# Patient Record
Sex: FEMALE | Race: BLACK OR AFRICAN AMERICAN | NOT HISPANIC OR LATINO | URBAN - METROPOLITAN AREA
[De-identification: names, ages, dates, MRNs, and addresses within clinical notes are randomized per-mention and may not be internally consistent; named-entity substitution may affect disease eponyms.]

---

## 2023-08-20 ENCOUNTER — EMERGENCY (EMERGENCY)
Facility: HOSPITAL | Age: 38
LOS: 1 days | Discharge: HOME | End: 2023-08-20
Attending: STUDENT IN AN ORGANIZED HEALTH CARE EDUCATION/TRAINING PROGRAM
Payer: MEDICAID

## 2023-08-20 VITALS
HEIGHT: 63 IN | WEIGHT: 199.96 LBS | OXYGEN SATURATION: 100 % | SYSTOLIC BLOOD PRESSURE: 140 MMHG | RESPIRATION RATE: 18 BRPM | DIASTOLIC BLOOD PRESSURE: 88 MMHG | HEART RATE: 73 BPM | TEMPERATURE: 98 F

## 2023-08-20 PROCEDURE — 99243 OFF/OP CNSLTJ NEW/EST LOW 30: CPT

## 2023-08-20 PROCEDURE — 99285 EMERGENCY DEPT VISIT HI MDM: CPT

## 2023-08-21 VITALS
TEMPERATURE: 98 F | HEART RATE: 55 BPM | OXYGEN SATURATION: 100 % | DIASTOLIC BLOOD PRESSURE: 77 MMHG | SYSTOLIC BLOOD PRESSURE: 119 MMHG | RESPIRATION RATE: 18 BRPM

## 2023-08-21 LAB
ALBUMIN SERPL ELPH-MCNC: 4 G/DL — SIGNIFICANT CHANGE UP (ref 3.3–5)
ALP SERPL-CCNC: 69 U/L — SIGNIFICANT CHANGE UP (ref 40–120)
ALT FLD-CCNC: 16 U/L — SIGNIFICANT CHANGE UP (ref 10–45)
ANION GAP SERPL CALC-SCNC: 12 MMOL/L — SIGNIFICANT CHANGE UP (ref 5–17)
ANISOCYTOSIS BLD QL: SLIGHT — SIGNIFICANT CHANGE UP
APPEARANCE UR: CLEAR — SIGNIFICANT CHANGE UP
AST SERPL-CCNC: 33 U/L — SIGNIFICANT CHANGE UP (ref 10–40)
BASOPHILS # BLD AUTO: 0 K/UL — SIGNIFICANT CHANGE UP (ref 0–0.2)
BASOPHILS NFR BLD AUTO: 0 % — SIGNIFICANT CHANGE UP (ref 0–2)
BILIRUB SERPL-MCNC: 0.4 MG/DL — SIGNIFICANT CHANGE UP (ref 0.2–1.2)
BILIRUB UR-MCNC: NEGATIVE — SIGNIFICANT CHANGE UP
BUN SERPL-MCNC: 8 MG/DL — SIGNIFICANT CHANGE UP (ref 7–23)
C TRACH RRNA SPEC QL NAA+PROBE: SIGNIFICANT CHANGE UP
CALCIUM SERPL-MCNC: 9.1 MG/DL — SIGNIFICANT CHANGE UP (ref 8.4–10.5)
CHLORIDE SERPL-SCNC: 105 MMOL/L — SIGNIFICANT CHANGE UP (ref 96–108)
CO2 SERPL-SCNC: 21 MMOL/L — LOW (ref 22–31)
COLOR SPEC: YELLOW — SIGNIFICANT CHANGE UP
CREAT SERPL-MCNC: 0.6 MG/DL — SIGNIFICANT CHANGE UP (ref 0.5–1.3)
DACRYOCYTES BLD QL SMEAR: SLIGHT — SIGNIFICANT CHANGE UP
DIFF PNL FLD: NEGATIVE — SIGNIFICANT CHANGE UP
EGFR: 116 ML/MIN/1.73M2 — SIGNIFICANT CHANGE UP
ELLIPTOCYTES BLD QL SMEAR: SIGNIFICANT CHANGE UP
EOSINOPHIL # BLD AUTO: 0.08 K/UL — SIGNIFICANT CHANGE UP (ref 0–0.5)
EOSINOPHIL NFR BLD AUTO: 0.9 % — SIGNIFICANT CHANGE UP (ref 0–6)
GIANT PLATELETS BLD QL SMEAR: PRESENT — SIGNIFICANT CHANGE UP
GLUCOSE SERPL-MCNC: 81 MG/DL — SIGNIFICANT CHANGE UP (ref 70–99)
GLUCOSE UR QL: NEGATIVE — SIGNIFICANT CHANGE UP
HCG UR QL: NEGATIVE — SIGNIFICANT CHANGE UP
HCT VFR BLD CALC: 31.2 % — LOW (ref 34.5–45)
HGB BLD-MCNC: 9.4 G/DL — LOW (ref 11.5–15.5)
HIV 1+2 AB+HIV1 P24 AG SERPL QL IA: SIGNIFICANT CHANGE UP
KETONES UR-MCNC: NEGATIVE — SIGNIFICANT CHANGE UP
LEUKOCYTE ESTERASE UR-ACNC: NEGATIVE — SIGNIFICANT CHANGE UP
LIDOCAIN IGE QN: 32 U/L — SIGNIFICANT CHANGE UP (ref 7–60)
LYMPHOCYTES # BLD AUTO: 3.4 K/UL — HIGH (ref 1–3.3)
LYMPHOCYTES # BLD AUTO: 36.6 % — SIGNIFICANT CHANGE UP (ref 13–44)
MACROCYTES BLD QL: SLIGHT — SIGNIFICANT CHANGE UP
MANUAL SMEAR VERIFICATION: SIGNIFICANT CHANGE UP
MCHC RBC-ENTMCNC: 18.1 PG — LOW (ref 27–34)
MCHC RBC-ENTMCNC: 30.1 GM/DL — LOW (ref 32–36)
MCV RBC AUTO: 60 FL — LOW (ref 80–100)
MICROCYTES BLD QL: SIGNIFICANT CHANGE UP
MONOCYTES # BLD AUTO: 0.42 K/UL — SIGNIFICANT CHANGE UP (ref 0–0.9)
MONOCYTES NFR BLD AUTO: 4.5 % — SIGNIFICANT CHANGE UP (ref 2–14)
N GONORRHOEA RRNA SPEC QL NAA+PROBE: SIGNIFICANT CHANGE UP
NEUTROPHILS # BLD AUTO: 5.39 K/UL — SIGNIFICANT CHANGE UP (ref 1.8–7.4)
NEUTROPHILS NFR BLD AUTO: 58 % — SIGNIFICANT CHANGE UP (ref 43–77)
NITRITE UR-MCNC: NEGATIVE — SIGNIFICANT CHANGE UP
OVALOCYTES BLD QL SMEAR: SLIGHT — SIGNIFICANT CHANGE UP
PH UR: 5.5 — SIGNIFICANT CHANGE UP (ref 5–8)
PLAT MORPH BLD: NORMAL — SIGNIFICANT CHANGE UP
PLATELET # BLD AUTO: 267 K/UL — SIGNIFICANT CHANGE UP (ref 150–400)
POIKILOCYTOSIS BLD QL AUTO: SIGNIFICANT CHANGE UP
POTASSIUM SERPL-MCNC: 4.7 MMOL/L — SIGNIFICANT CHANGE UP (ref 3.5–5.3)
POTASSIUM SERPL-SCNC: 4.7 MMOL/L — SIGNIFICANT CHANGE UP (ref 3.5–5.3)
PROT SERPL-MCNC: 7.3 G/DL — SIGNIFICANT CHANGE UP (ref 6–8.3)
PROT UR-MCNC: SIGNIFICANT CHANGE UP
RBC # BLD: 5.2 M/UL — SIGNIFICANT CHANGE UP (ref 3.8–5.2)
RBC # FLD: 23.5 % — HIGH (ref 10.3–14.5)
RBC BLD AUTO: ABNORMAL
SCHISTOCYTES BLD QL AUTO: SLIGHT — SIGNIFICANT CHANGE UP
SODIUM SERPL-SCNC: 138 MMOL/L — SIGNIFICANT CHANGE UP (ref 135–145)
SP GR SPEC: 1.03 — HIGH (ref 1.01–1.02)
SPECIMEN SOURCE: SIGNIFICANT CHANGE UP
T PALLIDUM AB TITR SER: NEGATIVE — SIGNIFICANT CHANGE UP
TARGETS BLD QL SMEAR: SLIGHT — SIGNIFICANT CHANGE UP
UROBILINOGEN FLD QL: NEGATIVE — SIGNIFICANT CHANGE UP
WBC # BLD: 9.3 K/UL — SIGNIFICANT CHANGE UP (ref 3.8–10.5)
WBC # FLD AUTO: 9.3 K/UL — SIGNIFICANT CHANGE UP (ref 3.8–10.5)

## 2023-08-21 PROCEDURE — 76830 TRANSVAGINAL US NON-OB: CPT

## 2023-08-21 PROCEDURE — 36415 COLL VENOUS BLD VENIPUNCTURE: CPT

## 2023-08-21 PROCEDURE — 81025 URINE PREGNANCY TEST: CPT

## 2023-08-21 PROCEDURE — 87491 CHLMYD TRACH DNA AMP PROBE: CPT

## 2023-08-21 PROCEDURE — 99285 EMERGENCY DEPT VISIT HI MDM: CPT | Mod: 25

## 2023-08-21 PROCEDURE — 87086 URINE CULTURE/COLONY COUNT: CPT

## 2023-08-21 PROCEDURE — 93975 VASCULAR STUDY: CPT | Mod: 26

## 2023-08-21 PROCEDURE — 96374 THER/PROPH/DIAG INJ IV PUSH: CPT | Mod: XU

## 2023-08-21 PROCEDURE — 80053 COMPREHEN METABOLIC PANEL: CPT

## 2023-08-21 PROCEDURE — 81001 URINALYSIS AUTO W/SCOPE: CPT

## 2023-08-21 PROCEDURE — 74177 CT ABD & PELVIS W/CONTRAST: CPT | Mod: MA

## 2023-08-21 PROCEDURE — 85025 COMPLETE CBC W/AUTO DIFF WBC: CPT

## 2023-08-21 PROCEDURE — 86703 HIV-1/HIV-2 1 RESULT ANTBDY: CPT

## 2023-08-21 PROCEDURE — 81003 URINALYSIS AUTO W/O SCOPE: CPT

## 2023-08-21 PROCEDURE — 96376 TX/PRO/DX INJ SAME DRUG ADON: CPT

## 2023-08-21 PROCEDURE — 83690 ASSAY OF LIPASE: CPT

## 2023-08-21 PROCEDURE — 84702 CHORIONIC GONADOTROPIN TEST: CPT

## 2023-08-21 PROCEDURE — 93975 VASCULAR STUDY: CPT

## 2023-08-21 PROCEDURE — 74177 CT ABD & PELVIS W/CONTRAST: CPT | Mod: 26,MA

## 2023-08-21 PROCEDURE — 87591 N.GONORRHOEAE DNA AMP PROB: CPT

## 2023-08-21 PROCEDURE — 76830 TRANSVAGINAL US NON-OB: CPT | Mod: 26

## 2023-08-21 PROCEDURE — 76856 US EXAM PELVIC COMPLETE: CPT

## 2023-08-21 PROCEDURE — 86780 TREPONEMA PALLIDUM: CPT

## 2023-08-21 PROCEDURE — 76856 US EXAM PELVIC COMPLETE: CPT | Mod: 26,59

## 2023-08-21 PROCEDURE — 96375 TX/PRO/DX INJ NEW DRUG ADDON: CPT

## 2023-08-21 PROCEDURE — 87389 HIV-1 AG W/HIV-1&-2 AB AG IA: CPT

## 2023-08-21 RX ORDER — MORPHINE SULFATE 50 MG/1
4 CAPSULE, EXTENDED RELEASE ORAL ONCE
Refills: 0 | Status: DISCONTINUED | OUTPATIENT
Start: 2023-08-21 | End: 2023-08-21

## 2023-08-21 RX ORDER — KETOROLAC TROMETHAMINE 30 MG/ML
15 SYRINGE (ML) INJECTION ONCE
Refills: 0 | Status: DISCONTINUED | OUTPATIENT
Start: 2023-08-21 | End: 2023-08-21

## 2023-08-21 RX ORDER — SODIUM CHLORIDE 9 MG/ML
1000 INJECTION INTRAMUSCULAR; INTRAVENOUS; SUBCUTANEOUS ONCE
Refills: 0 | Status: COMPLETED | OUTPATIENT
Start: 2023-08-21 | End: 2023-08-21

## 2023-08-21 RX ORDER — ONDANSETRON 8 MG/1
4 TABLET, FILM COATED ORAL ONCE
Refills: 0 | Status: COMPLETED | OUTPATIENT
Start: 2023-08-21 | End: 2023-08-21

## 2023-08-21 RX ORDER — MORPHINE SULFATE 50 MG/1
4 CAPSULE, EXTENDED RELEASE ORAL ONCE
Refills: 0 | Status: COMPLETED | OUTPATIENT
Start: 2023-08-21 | End: 2023-08-21

## 2023-08-21 RX ORDER — ACETAMINOPHEN 500 MG
975 TABLET ORAL ONCE
Refills: 0 | Status: COMPLETED | OUTPATIENT
Start: 2023-08-21 | End: 2023-08-21

## 2023-08-21 RX ORDER — ACETAMINOPHEN 500 MG
1000 TABLET ORAL ONCE
Refills: 0 | Status: COMPLETED | OUTPATIENT
Start: 2023-08-21 | End: 2023-08-21

## 2023-08-21 RX ADMIN — MORPHINE SULFATE 4 MILLIGRAM(S): 50 CAPSULE, EXTENDED RELEASE ORAL at 10:17

## 2023-08-21 RX ADMIN — Medication 975 MILLIGRAM(S): at 14:51

## 2023-08-21 RX ADMIN — ONDANSETRON 4 MILLIGRAM(S): 8 TABLET, FILM COATED ORAL at 02:41

## 2023-08-21 RX ADMIN — Medication 15 MILLIGRAM(S): at 08:54

## 2023-08-21 RX ADMIN — SODIUM CHLORIDE 1000 MILLILITER(S): 9 INJECTION INTRAMUSCULAR; INTRAVENOUS; SUBCUTANEOUS at 02:16

## 2023-08-21 RX ADMIN — MORPHINE SULFATE 4 MILLIGRAM(S): 50 CAPSULE, EXTENDED RELEASE ORAL at 02:16

## 2023-08-21 RX ADMIN — Medication 400 MILLIGRAM(S): at 02:41

## 2023-08-21 NOTE — CONSULT NOTE ADULT - ASSESSMENT
**INCOMPELTE***    37yo  LMP 7/ coming in with 1 week of stabbing LLQ pain improved with Tylenol, Toradol, Morphine here in the ED. Patient incidentally found to have right ovarian cysts largest 4.1cm, simple appearing. Patient does not have an acute abdomen and vital signs are normal. Low concern for acute GYN process such as torsion    #LLQ pain  - Possibly GI in nature given patient's self reported constipation and questionable passage of flatus. No peritoneal signs on exam.  - Left adnexa wnl on TVUS.  - c/w Tylenol, Toradol. Use opioids with caution given possible constipating side effect  - Recommend bowel regimen. On CTAP normal appendix and bowels    #Bilateral adnexal cysts  - 2 cysts 3 and 4cm, simple in appearance. Blood flow seen to both ovaries  - Recommend f/u outpatient. Patient may see NS clinic    [address] [place] [address] [address]  [place] [address] [phone]    SAMIR Linn, PGY3 39yo  LMP 7/ coming in with 1 week of stabbing LLQ pain improved with Tylenol, Toradol, Morphine here in the ED. Patient incidentally found to have right ovarian cysts largest 4.1cm, simple appearing. Patient does not have an acute abdomen and vital signs are normal. Low concern for acute GYN process such as torsion.    #LLQ pain  - Possibly GI in nature given patient's self reported constipation and questionable passage of flatus. No peritoneal signs on exam.  - Left adnexa wnl on TVUS. 3.2cm left adnexal cyst on CTAP  - c/w Tylenol, Toradol. Use opioids with caution given possible constipating side effect  - Recommend bowel regimen. On CTAP normal appendix and bowels    #Bilateral adnexal cysts  - 2 right adnexal cysts 3 and 4cm, simple in appearance. Blood flow seen to both ovaries  - Recommend f/u outpatient. Patient may see [address]  [place] [address]  [phone]    seen and d/w Dr. Ruslan Linn, PGY3

## 2023-08-21 NOTE — CONSULT NOTE ADULT - SUBJECTIVE AND OBJECTIVE BOX
KLAUS JOHNSON  38y  Female [id number]    HPI: 37yo  LMP 7/4 coming in with 1 week of stabbing LLQ pain that has been constant for the past week. Patient has not been taking pain medicine at home because of lack of access. She reports nausea and decreased appetite but denies vomiting. Denies fevers, chills, sick contacts, vaginal bleeding itching or burning. She has felt constipated the past week and last had a small bowel movement 2 days ago. Notably she passed small pebble like stool. She does not recall passing flatus since then. Patient denies alleviating or aggravating factors. Pain improved after recieving Tylenol, Toradol, and Morphine x2 in the ED. She arrived to the ED with 10/10 pain and last received morphine at 10am. Now 5 hrs later she reports return of pain to 8/10. Patient denied history of cysts prior to today's ED finding.    Name of GYN Physician: Last saw GYN in [place] 3 yrs ago    POB:  x1 @37wga, pC/S @27wga and rC/S @36wga c/b cervical insufficiency.   spontaneous  x1, D&Cx1  Pgyn: Denies fibroids, cysts, endometriosis, STI's, abnormal pap smears   PMH: Asthma- hospitalized 1 month ago on steroids  PSH: C/Sx2  Meds: Albuterol  All: NKDA  SH: Denies smoking use, drug use, alcohol use.       Hospital Meds:   MEDICATIONS  (STANDING):  acetaminophen     Tablet .. 975 milliGRAM(s) Oral once  morphine  - Injectable 4 milliGRAM(s) IV Push Once    MEDICATIONS  (PRN):      FAMILY HISTORY:      Vital Signs Last 24 Hrs  T(C): 36.6 (21 Aug 2023 12:00), Max: 36.9 (20 Aug 2023 23:31)  T(F): 97.9 (21 Aug 2023 12:00), Max: 98.4 (20 Aug 2023 23:31)  HR: 55 (21 Aug 2023 12:00) (50 - 73)  BP: 119/77 (21 Aug 2023 12:00) (119/77 - 144/80)  BP(mean): 91 (21 Aug 2023 12:00) (91 - 101)  RR: 18 (21 Aug 2023 12:00) (18 - 18)  SpO2: 100% (21 Aug 2023 12:00) (100% - 100%)    Parameters below as of 21 Aug 2023 12:00  Patient On (Oxygen Delivery Method): room air      Chaperone: Live Agosto MD  Physical Exam:   General: sitting comfortably in bed, NAD   CV: RR S1, S2 no m/r/g  Lungs: CTA b/l, good air flow b/l   Abd: Soft, moderately tender in LLQ, no guarding or rebound, non-distended.  Bowel sounds present.    :  No bleeding on pad. External labia wnl.  Bimanual exam with no cervical motion tenderness, moderate pain in left fornix. Uterus unable to palpate 2/2 body habitus, adnexa non palpable b/l.  Cervix closed  Ext: non-tender b/l, no edema     LABS:      Pregnancy Profile, Urine: Negative (23 @ 04:14)                          9.4    9.30  )-----------( 267      ( 21 Aug 2023 02:29 )             31.2         138  |  105  |  8   ----------------------------<  81  4.7   |  21<L>  |  0.60    Ca    9.1      21 Aug 2023 02:29    TPro  7.3  /  Alb  4.0  /  TBili  0.4  /  DBili  x   /  AST  33  /  ALT  16  /  AlkPhos  69      I&O's Detail      Urinalysis Basic - ( 21 Aug 2023 04:14 )    Color: Yellow / Appearance: Clear / S.028 / pH: x  Gluc: x / Ketone: Negative  / Bili: Negative / Urobili: Negative   Blood: x / Protein: Trace / Nitrite: Negative   Leuk Esterase: Negative / RBC: 2 /hpf / WBC 5 /HPF   Sq Epi: x / Non Sq Epi: x / Bacteria: Negative        RADIOLOGY & ADDITIONAL STUDIES:  < from: US Doppler Pelvis (23 @ 03:33) >    ACC: [id number] EXAM:  US TRANSVAGINAL   ORDERED BY:  JL JASMINE     ACC: [id number] EXAM:  US DPLX PELVIC   ORDERED BY:  JL JASMINE     ACC: [id number] EXAM:  US PELVIC COMPLETE   ORDERED BY:  JL JASMINE     PROCEDURE DATE:  2023          INTERPRETATION:  CLINICAL INFORMATION: Left pelvic/left lower quadrant   abdominal pain. Evaluate for torsion.    LMP: 2023    COMPARISON: None available.    TECHNIQUE: Endovaginal and transabdominal pelvic sonogram. Color and   Spectral Doppler was performed.    FINDINGS:  Uterus: 11.0 cm x 5.0 cm x 6.1 cm. Within normal limits.  Endometrium: 4 mm. Within normal limits.  Cervix: Multiple nabothian cysts some of which are complex.    Right ovary: 4.7 cm x 5.6 cm x 3.9 cm. 3.1 cm x 3.5 cmx 3.4 cm cyst. 2.7   cm x 3.1 cm x 4.1 cm cyst. Normal arterial and venous waveforms.  Left ovary: 3.3 cm x 1.7 cm x 2.4 cm. Within normal limits. Normal   arterial and venous waveforms.    Fluid: Trace free fluid in the cul-de-sac.    IMPRESSION:  No evidence of ovarian torsion. Multiple small right ovarian cysts.        --- End of Report ---            KOLBY HARRIS MD; Attending Radiologist  This document has been electronically signed. Aug 21 2023  3:56AM    < end of copied text >         KLAUS JOHNSON  38y  Female [id number]    HPI: 37yo  LMP 7/4 coming in with 1 week of stabbing LLQ pain that has been constant for the past week. Patient has not been taking pain medicine at home because of lack of access. She reports nausea and decreased appetite but denies vomiting. Denies fevers, chills, sick contacts, vaginal bleeding itching or burning. She has felt constipated the past week and last had a small bowel movement 2 days ago. Notably she passed small pebble like stool. She does not recall passing flatus since then. Patient denies alleviating or aggravating factors. Pain improved after recieving Tylenol, Toradol, and Morphine x2 in the ED. She arrived to the ED with 10/10 pain and last received morphine at 10am. Now 5 hrs later she reports return of pain to 8/10. Patient denied history of cysts prior to today's ED finding.    Name of GYN Physician: Last saw GYN in [place] 3 yrs ago    POB:  x1 @37wga, pC/S @27wga and rC/S @36wga c/b cervical insufficiency.   spontaneous  x1, D&Cx1  Pgyn: Denies fibroids, cysts, endometriosis, STI's, abnormal pap smears   PMH: Asthma- hospitalized 1 month ago on steroids  PSH: C/Sx2  Meds: Albuterol  All: NKDA  SH: Denies smoking use, drug use, alcohol use.       Hospital Meds:   MEDICATIONS  (STANDING):  acetaminophen     Tablet .. 975 milliGRAM(s) Oral once  morphine  - Injectable 4 milliGRAM(s) IV Push Once    MEDICATIONS  (PRN):      FAMILY HISTORY:      Vital Signs Last 24 Hrs  T(C): 36.6 (21 Aug 2023 12:00), Max: 36.9 (20 Aug 2023 23:31)  T(F): 97.9 (21 Aug 2023 12:00), Max: 98.4 (20 Aug 2023 23:31)  HR: 55 (21 Aug 2023 12:00) (50 - 73)  BP: 119/77 (21 Aug 2023 12:00) (119/77 - 144/80)  BP(mean): 91 (21 Aug 2023 12:00) (91 - 101)  RR: 18 (21 Aug 2023 12:00) (18 - 18)  SpO2: 100% (21 Aug 2023 12:00) (100% - 100%)    Parameters below as of 21 Aug 2023 12:00  Patient On (Oxygen Delivery Method): room air      Chaperone: Live Agosto MD  Physical Exam:   General: sitting comfortably in bed, NAD   CV: RR S1, S2 no m/r/g  Lungs: CTA b/l, good air flow b/l   Abd: Soft, moderately tender in LLQ, no guarding or rebound, non-distended.  Bowel sounds present.    :  No bleeding on pad. External labia wnl.  Bimanual exam with no cervical motion tenderness, moderate pain in left fornix. Uterus unable to palpate 2/2 body habitus, adnexa non palpable b/l.  Cervix closed  Ext: non-tender b/l, no edema     LABS:      Pregnancy Profile, Urine: Negative (23 @ 04:14)                          9.4    9.30  )-----------( 267      ( 21 Aug 2023 02:29 )             31.2         138  |  105  |  8   ----------------------------<  81  4.7   |  21<L>  |  0.60    Ca    9.1      21 Aug 2023 02:29    TPro  7.3  /  Alb  4.0  /  TBili  0.4  /  DBili  x   /  AST  33  /  ALT  16  /  AlkPhos  69      I&O's Detail      Urinalysis Basic - ( 21 Aug 2023 04:14 )    Color: Yellow / Appearance: Clear / S.028 / pH: x  Gluc: x / Ketone: Negative  / Bili: Negative / Urobili: Negative   Blood: x / Protein: Trace / Nitrite: Negative   Leuk Esterase: Negative / RBC: 2 /hpf / WBC 5 /HPF   Sq Epi: x / Non Sq Epi: x / Bacteria: Negative        RADIOLOGY & ADDITIONAL STUDIES:  < from: US Doppler Pelvis (23 @ 03:33) >    ACC: [id number] EXAM:  US TRANSVAGINAL   ORDERED BY:  JL JASMINE     ACC: [id number] EXAM:  US DPLX PELVIC   ORDERED BY:  JL JASMINE     ACC: [id number] EXAM:  US PELVIC COMPLETE   ORDERED BY:  JL JASMINE     PROCEDURE DATE:  2023          INTERPRETATION:  CLINICAL INFORMATION: Left pelvic/left lower quadrant   abdominal pain. Evaluate for torsion.    LMP: 2023    COMPARISON: None available.    TECHNIQUE: Endovaginal and transabdominal pelvic sonogram. Color and   Spectral Doppler was performed.    FINDINGS:  Uterus: 11.0 cm x 5.0 cm x 6.1 cm. Within normal limits.  Endometrium: 4 mm. Within normal limits.  Cervix: Multiple nabothian cysts some of which are complex.    Right ovary: 4.7 cm x 5.6 cm x 3.9 cm. 3.1 cm x 3.5 cmx 3.4 cm cyst. 2.7   cm x 3.1 cm x 4.1 cm cyst. Normal arterial and venous waveforms.  Left ovary: 3.3 cm x 1.7 cm x 2.4 cm. Within normal limits. Normal   arterial and venous waveforms.    Fluid: Trace free fluid in the cul-de-sac.    IMPRESSION:  No evidence of ovarian torsion. Multiple small right ovarian cysts.        --- End of Report ---            KOLBY HARRIS MD; Attending Radiologist  This document has been electronically signed. Aug 21 2023  3:56AM    < end of copied text >    < from: CT Abdomen and Pelvis w/ IV Cont (23 @ 07:35) >  ACC: [id number] EXAM:  CT ABDOMEN AND PELVIS IC   ORDERED BY:  JL JASMINE     PROCEDURE DATE:  2023          INTERPRETATION:  CLINICAL INFORMATION: Left lower quadrant tenderness.    COMPARISON: None.    CONTRAST/COMPLICATIONS:  IV Contrast: Omnipaque 300  90 cc administered   10 cc discarded  Oral Contrast: NONE  Complications: None reported at time of study completion    PROCEDURE:  CT of the Abdomen and Pelvis was performed.  Sagittal and coronal reformats were performed.    FINDINGS:  LOWER CHEST: Two 0.3 cm right middle lobe solid nodules.    LIVER: Within normal limits.  BILE DUCTS: Normal caliber.  GALLBLADDER: Within normal limits.  SPLEEN: Within normal limits.  PANCREAS: Within normal limits.  ADRENALS: Within normal limits.  KIDNEYS/URETERS: Within normal limits.    BLADDER: Mild perivesical fat stranding.  REPRODUCTIVE ORGANS: Normal uterus. Bilateral adnexal/ovarian cysts. The   right adnexal cyst measures 6.0 x 3.4 cm. The left adnexal cyst measures   3.2 x 2.0 cm.    BOWEL: No bowel obstruction. Appendix is normal.  PERITONEUM: No ascites.  VESSELS: Within normal limits.  RETROPERITONEUM/LYMPH NODES: No lymphadenopathy.  ABDOMINAL WALL: Within normal limits.  BONES: Mild degenerative changes of the spine.    IMPRESSION:  Mild perivesical fat stranding. Correlate with urinalysis to rule out   cystitis.  No bowel obstruction or inflammation. Normal appendix  Bilateral adnexal/ovarian cysts. The right adnexal cyst measuring up to   6.0 cm. Recommend furthernormal pelvic sonogram.        --- End of Report ---            ROBERT WILLIAM MD; Attending Radiologist  This document has been electronically signed. Aug 21 2023  8:16AM    < end of copied text >

## 2023-08-21 NOTE — ED PROVIDER NOTE - CLINICAL SUMMARY MEDICAL DECISION MAKING FREE TEXT BOX
38-year-old female past medical history of asthma presenting with 1 week of lower abdominal pain that is acutely worse today.   Vital signs nonactionable.  Exam with suprapubic and left lower quadrant tenderness without guarding or rebound.  Plan to obtain CBC CMP UA culture hCG and ct abdpelvis transvaginal ultrasound give antiemetics, fluids, pain control

## 2023-08-21 NOTE — ED PROVIDER NOTE - PROGRESS NOTE DETAILS
Live Agosto- pt still has some pain. w/u wnl. will give toradol and po chall. Live Agosto- pt wants to leave, discussed risks of leaving including worsened pain, benefits including continued pain control. still awaiting obgyn recs, pt does not want to stay. pt expresses understanding. instructed to f/u with obgyn.

## 2023-08-21 NOTE — ED PROVIDER NOTE - ATTENDING CONTRIBUTION TO CARE
38-year-old female past medical history of asthma presenting with 1 week of lower abdominal pain that is acutely worse today.  Pain moslty in lower abd, 8/10, radiating b/l.     Vital signs reviewed  GENERAL: Patient nontoxic appearing, NAD  HEAD: NCAT  EYES: Anicteric  ENT: MMM  NECK: Supple, non tender  RESPIRATORY: Normal respiratory effort. CTA B/L. No wheezing, rales, rhonchi  CARDIOVASCULAR: Regular rate and rhythm  ABDOMEN: Soft. suprapubic tenderness to palpation, no guarding   MUSCULOSKELETAL/EXTREMITIES: Brisk cap refill. 2+ radial pulses. No leg edema.  SKIN:  Warm and dry  NEURO: AAOx3. No gross FND.  PSYCHIATRIC: Cooperative. Affect appropriate.     Differential diagnosis likely uti, pyelo, diverticular dz, ovarian pathology  Abdominal exam without peritoneal signs. No evidence of acute abdomen at this time. Well appearing. Low suspicion for vascular catastrophe, viscus perforation, AAA, Dissection given history, vitals, and exam. Presentation not consistent with other acute, emergent causes of abdominal pain at this time.  Plan: labs, UA, CT AP, pain control, serial reassessment

## 2023-08-21 NOTE — ED PROVIDER NOTE - NSFOLLOWUPINSTRUCTIONS_ED_ALL_ED_FT
You were seen in the ER for abdominal pain. Your lab results showed mild anemia. Your CT scan and ultrasound showed that you have cysts on your ovaries.    Please follow up with the obgyn doctor.  Please follow up with your primary care doctor.    Please return to the ER if you have worsening symptoms including fever, chest pain, shortness of breath, abdominal pain, nausea, vomiting, diarrhea, weakness or lightheadedness/fainting.

## 2023-08-21 NOTE — ED ADULT NURSE NOTE - OBJECTIVE STATEMENT
39YO female with PMH  asthma via walk in presenting with complaints of  lower abdominal pain. Pt states having lower abdominal pain for 1 week, with worsening pain today, urinary frequency and nausea. Pt Axox4, respirations even, & non-labored. radial pulses strong and equal bilaterally. Skin warm, dry, and intact. Pt denies needs at this time.  Denies CP, SOB, vomiting diarrhea, vaginal discharge or bleeding. Pt placed in position of comfort. Bed in lowest position, wheels locked, appropriate side rails raised.

## 2023-08-21 NOTE — ED PROVIDER NOTE - OBJECTIVE STATEMENT
38-year-old female past medical history of asthma presenting with 1 week of lower abdominal pain that is acutely worse today.  Patient notes associated nausea.  Notes pain has been constant since worsening today and nonradiating.  Denies chest pain shortness of breath vomiting diarrhea hematochezia vaginal discharge or bleeding.  Endorses some urinary frequency.

## 2023-08-21 NOTE — ED PROVIDER NOTE - PATIENT PORTAL LINK FT
You can access the FollowMyHealth Patient Portal offered by [facility] by registering at the following website: [email or web address]. By joining [employer]’s FollowMyHealth portal, you will also be able to view your health information using other applications (apps) compatible with our system.

## 2023-08-21 NOTE — CONSULT NOTE ADULT - ATTENDING COMMENTS
Pt seen in ER w gyn resident c/o LLQ pain x 1 wk.  Mild nausea;  Denies fevers.  Afebrile  Abd- soft;  mild LLQ tenderness;  no rebound or guarding  CT and sono reviewed  No gyn etiology of pain.  Advised pt to f/u in gyn clinic for ovarian cyst and late menses.  Plan d/w pt.  All questions answered.

## 2023-08-21 NOTE — ED PROVIDER NOTE - NSFOLLOWUPCLINICS_GEN_ALL_ED_FT
[facility] - [facility]  OB/GYN & Surg  [address] [place] [id number]  Phone: [phone]  Fax:     [facility] Gynecology and Obstetrics  Gynceology/OB  [address] [place] [address]  Phone: [phone]  Fax:

## 2023-08-21 NOTE — ED ADULT NURSE REASSESSMENT NOTE - NS ED NURSE REASSESS COMMENT FT1
CT was contacted to follow up about wait time for scan, states pt is next and is sending transporter.
Introduced self to pt, VSS. Awaiting CTr. Updated on POC.
Pt resting in bed, VSS, NAD. Moved into [facility] for OBGYN to see.
Pt resting in bed, VSS. Abd pain still persistent, but pt verbalizing need to leave hospital to care for daughter. Pt educated on importance of staying and verbalized understanding. MD Agosto notified.
Pt verbalizing pain still 10/10. Md Mancini notified.
Pt states morphine helped w/ partial relief of pain. Verbalized pain 7/10 on pain scale.

## 2023-08-21 NOTE — ED PROVIDER NOTE - NS ED ATTENDING STATEMENT MOD
I have seen and examined this patient and fully participated in the care of this patient as the teaching attending.  The service was shared with the MAX.  I reviewed and verified the documentation and independently performed the documented:

## 2023-08-21 NOTE — ED PROVIDER NOTE - PHYSICAL EXAMINATION
PHYSICAL EXAM:  CONSTITUTIONAL: uncomfortable appearing holding lower abdomen   HEAD: Atraumatic  EYES: Clear bilaterally, pupils equal, round and reactive to light.  CARDIAC: Normal rate, regular rhythm. +S1/S2. No murmurs, rubs or gallops.  RESPIRATORY: Breathing unlabored. Breath sounds clear and equal bilaterally.  ABDOMEN: suprapubic and LLQ tenderness no guarding/rebound   SKIN: Skin warm and dry. No evidence of rashes or lesions.

## 2023-08-22 LAB
CULTURE RESULTS: SIGNIFICANT CHANGE UP
SPECIMEN SOURCE: SIGNIFICANT CHANGE UP